# Patient Record
Sex: FEMALE | Race: WHITE | Employment: UNEMPLOYED | URBAN - METROPOLITAN AREA
[De-identification: names, ages, dates, MRNs, and addresses within clinical notes are randomized per-mention and may not be internally consistent; named-entity substitution may affect disease eponyms.]

---

## 2017-01-10 ENCOUNTER — ROUTINE PRENATAL (OUTPATIENT)
Dept: OBGYN CLINIC | Facility: CLINIC | Age: 32
End: 2017-01-10

## 2017-01-10 VITALS
DIASTOLIC BLOOD PRESSURE: 81 MMHG | HEART RATE: 114 BPM | SYSTOLIC BLOOD PRESSURE: 129 MMHG | BODY MASS INDEX: 28 KG/M2 | WEIGHT: 182 LBS

## 2017-01-10 DIAGNOSIS — Z34.82 ENCOUNTER FOR SUPERVISION OF OTHER NORMAL PREGNANCY IN SECOND TRIMESTER: Primary | ICD-10-CM

## 2017-01-10 PROBLEM — O26.899 RH NEGATIVE, MATERNAL: Status: ACTIVE | Noted: 2017-01-10

## 2017-01-10 PROBLEM — Z67.91 RH NEGATIVE, MATERNAL: Status: ACTIVE | Noted: 2017-01-10

## 2017-01-10 LAB
APPEARANCE: CLEAR
MULTISTIX LOT#: NORMAL NUMERIC
URINE-COLOR: YELLOW

## 2017-01-14 ENCOUNTER — HOSPITAL ENCOUNTER (OUTPATIENT)
Dept: ULTRASOUND IMAGING | Age: 32
Discharge: HOME | End: 2017-01-14
Attending: OBSTETRICS & GYNECOLOGY
Payer: COMMERCIAL

## 2017-01-14 DIAGNOSIS — Z34.92 ENCOUNTER FOR SUPERVISION OF NORMAL PREGNANCY IN SECOND TRIMESTER, UNSPECIFIED GRAVIDITY: ICD-10-CM

## 2017-01-14 PROCEDURE — 76805 OB US >/= 14 WKS SNGL FETUS: CPT

## 2017-02-01 ENCOUNTER — OFFICE VISIT (OUTPATIENT)
Dept: DERMATOLOGY CLINIC | Facility: CLINIC | Age: 32
End: 2017-02-01

## 2017-02-01 DIAGNOSIS — D23.70 BENIGN NEOPLASM OF SKIN OF LOWER LIMB, INCLUDING HIP, UNSPECIFIED LATERALITY: ICD-10-CM

## 2017-02-01 DIAGNOSIS — D23.4 BENIGN NEOPLASM OF SCALP AND SKIN OF NECK: ICD-10-CM

## 2017-02-01 DIAGNOSIS — D23.9 BENIGN NEOPLASM OF SKIN, UNSPECIFIED LOCATION: ICD-10-CM

## 2017-02-01 DIAGNOSIS — D23.5 BENIGN NEOPLASM OF SKIN OF TRUNK, EXCEPT SCROTUM: ICD-10-CM

## 2017-02-01 DIAGNOSIS — D23.30 BENIGN NEOPLASM OF SKIN OF FACE: ICD-10-CM

## 2017-02-01 DIAGNOSIS — D22.9 MULTIPLE NEVI: Primary | ICD-10-CM

## 2017-02-01 PROCEDURE — 99212 OFFICE O/P EST SF 10 MIN: CPT | Performed by: DERMATOLOGY

## 2017-02-01 PROCEDURE — 99213 OFFICE O/P EST LOW 20 MIN: CPT | Performed by: DERMATOLOGY

## 2017-02-09 ENCOUNTER — ROUTINE PRENATAL (OUTPATIENT)
Dept: OBGYN CLINIC | Facility: CLINIC | Age: 32
End: 2017-02-09

## 2017-02-09 VITALS
BODY MASS INDEX: 29 KG/M2 | SYSTOLIC BLOOD PRESSURE: 134 MMHG | HEART RATE: 123 BPM | WEIGHT: 189.38 LBS | DIASTOLIC BLOOD PRESSURE: 88 MMHG

## 2017-02-09 DIAGNOSIS — Z34.92 ENCOUNTER FOR SUPERVISION OF NORMAL PREGNANCY IN SECOND TRIMESTER, UNSPECIFIED GRAVIDITY: Primary | ICD-10-CM

## 2017-02-09 LAB
MULTISTIX LOT#: NORMAL NUMERIC
PH, URINE: 7 (ref 4.5–8)
SPECIFIC GRAVITY: 1.01 (ref 1–1.03)
UROBILINOGEN,SEMI-QN: 0.2 MG/DL (ref 0–1.9)

## 2017-02-09 NOTE — PROGRESS NOTES
Reviewed Level 1 US. Pt with SOB when running after kids otherwise no SOB or CP.  Given lab orders and instructions for Rhogam   RTC 4 wks

## 2017-02-13 NOTE — PROGRESS NOTES
Juan Manuel Goodman is a 27year old female. HPI:     CC:  Patient presents with:  Full Skin Exam: LOV 7/27/2015 with Dr. Maribel Burgess. pt presenting for full body skin exam. Pt denies personal and family hx of skin cancer.         Allergies:  Review of patient's al anesthetic No     Social History Narrative     Family History   Problem Relation Age of Onset   • Heart Disease Paternal Grandmother      hypotension   • Eye Problems Paternal Grandmother      macular degeneration   • Breast Cancer Other      cause of deat diagnosis)  Benign neoplasm of scalp and skin of neck  Benign neoplasm of skin of face  Benign neoplasm of skin of trunk, except scrotum  Benign neoplasm of skin of lower limb, including hip, unspecified laterality  Benign neoplasm of skin, unspecified loc

## 2017-03-04 ENCOUNTER — APPOINTMENT (OUTPATIENT)
Dept: LAB | Facility: HOSPITAL | Age: 32
End: 2017-03-04
Attending: OBSTETRICS & GYNECOLOGY
Payer: COMMERCIAL

## 2017-03-04 DIAGNOSIS — Z34.92 ENCOUNTER FOR SUPERVISION OF NORMAL PREGNANCY IN SECOND TRIMESTER, UNSPECIFIED GRAVIDITY: ICD-10-CM

## 2017-03-04 LAB
ANTIBODY SCREEN: NEGATIVE
ERYTHROCYTE [DISTWIDTH] IN BLOOD BY AUTOMATED COUNT: 13.6 % (ref 11–15)
GLUCOSE 1H P 50 G GLC PO SERPL-MCNC: 147 MG/DL
HCT VFR BLD AUTO: 37 % (ref 35–48)
HGB BLD-MCNC: 12.7 G/DL (ref 12–16)
MCH RBC QN AUTO: 32.1 PG (ref 27–32)
MCHC RBC AUTO-ENTMCNC: 34.2 G/DL (ref 32–37)
MCV RBC AUTO: 93.6 FL (ref 80–100)
PLATELET # BLD AUTO: 220 K/UL (ref 140–400)
PMV BLD AUTO: 8.4 FL (ref 7.4–10.3)
RBC # BLD AUTO: 3.95 M/UL (ref 3.7–5.4)
RH BLOOD TYPE: NEGATIVE
WBC # BLD AUTO: 14.7 K/UL (ref 4–11)

## 2017-03-04 PROCEDURE — 86850 RBC ANTIBODY SCREEN: CPT

## 2017-03-04 PROCEDURE — 82950 GLUCOSE TEST: CPT

## 2017-03-04 PROCEDURE — 86900 BLOOD TYPING SEROLOGIC ABO: CPT

## 2017-03-04 PROCEDURE — 85027 COMPLETE CBC AUTOMATED: CPT

## 2017-03-04 PROCEDURE — 36415 COLL VENOUS BLD VENIPUNCTURE: CPT

## 2017-03-04 PROCEDURE — 86901 BLOOD TYPING SEROLOGIC RH(D): CPT

## 2017-03-06 ENCOUNTER — TELEPHONE (OUTPATIENT)
Dept: OBGYN CLINIC | Facility: CLINIC | Age: 32
End: 2017-03-06

## 2017-03-06 DIAGNOSIS — Z34.82 ENCOUNTER FOR SUPERVISION OF OTHER NORMAL PREGNANCY IN SECOND TRIMESTER: Primary | ICD-10-CM

## 2017-03-06 NOTE — TELEPHONE ENCOUNTER
Pt informed she did not pass the 1 hr glucose test and will need to do the 3hr gtt. Pt instructed to call to schedule an appt for it. Pt also informed she will need to fast for this test.  Order placed.

## 2017-03-06 NOTE — TELEPHONE ENCOUNTER
Called pt and rescheduled her 3/9 [facility] Pt needs rhogam and cannot get it at [facility]. Pt verbalized understanding.

## 2017-03-06 NOTE — TELEPHONE ENCOUNTER
----- Message from Cinthya Haro MD sent at 3/5/2017  6:34 PM CST -----  Please let patient know that she did not pass one hour glucose test and will need to take 3 hour test.

## 2017-03-10 ENCOUNTER — ROUTINE PRENATAL (OUTPATIENT)
Dept: OBGYN CLINIC | Facility: CLINIC | Age: 32
End: 2017-03-10

## 2017-03-10 VITALS
BODY MASS INDEX: 30 KG/M2 | SYSTOLIC BLOOD PRESSURE: 128 MMHG | HEART RATE: 102 BPM | DIASTOLIC BLOOD PRESSURE: 85 MMHG | WEIGHT: 196.63 LBS

## 2017-03-10 DIAGNOSIS — Z67.91 BLOOD TYPE, RH NEGATIVE: ICD-10-CM

## 2017-03-10 DIAGNOSIS — Z34.93 ENCOUNTER FOR SUPERVISION OF NORMAL PREGNANCY IN THIRD TRIMESTER, UNSPECIFIED GRAVIDITY: Primary | ICD-10-CM

## 2017-03-10 LAB
MULTISTIX LOT#: NORMAL NUMERIC
PH, URINE: 6 (ref 4.5–8)
SPECIFIC GRAVITY: 1.01 (ref 1–1.03)
UROBILINOGEN,SEMI-QN: 0.2 MG/DL (ref 0–1.9)

## 2017-03-10 PROCEDURE — 96372 THER/PROPH/DIAG INJ SC/IM: CPT | Performed by: OBSTETRICS & GYNECOLOGY

## 2017-03-10 NOTE — PROGRESS NOTES
Rhogam vaccine administered to pts left upper outer gluteus. Pt tolerated well. VIS information sheet given to pt and encouraged to call with any questions or concerns. Rhogam card given to pt and instructed to keep with her at all times.  Pt verbalized und

## 2017-03-10 NOTE — PATIENT INSTRUCTIONS
If You Are Rh Negative – Routine Administration    If you’re Rh negative, ask your health care provider about getting treated with RhoGam or Rhophylac. Even if you miscarry or don’t deliver the baby, you will still need treatment.  The health of any baby as directed      Location, date and time:  [facility] 3/10/2017 @ 11:20am

## 2017-03-11 ENCOUNTER — LAB ENCOUNTER (OUTPATIENT)
Dept: LAB | Facility: HOSPITAL | Age: 32
End: 2017-03-11
Attending: OBSTETRICS & GYNECOLOGY
Payer: COMMERCIAL

## 2017-03-11 DIAGNOSIS — Z34.82 ENCOUNTER FOR SUPERVISION OF OTHER NORMAL PREGNANCY IN SECOND TRIMESTER: ICD-10-CM

## 2017-03-11 LAB
GLUCOSE 1H P GLC SERPL-MCNC: 198 MG/DL
GLUCOSE 2H P GLC SERPL-MCNC: 180 MG/DL
GLUCOSE 3H P GLC SERPL-MCNC: 131 MG/DL
GLUCOSE P FAST SERPL-MCNC: 103 MG/DL (ref 70–99)

## 2017-03-11 PROCEDURE — 36415 COLL VENOUS BLD VENIPUNCTURE: CPT

## 2017-03-11 PROCEDURE — 82952 GTT-ADDED SAMPLES: CPT

## 2017-03-11 PROCEDURE — 82951 GLUCOSE TOLERANCE TEST (GTT): CPT

## 2017-03-13 ENCOUNTER — TELEPHONE (OUTPATIENT)
Dept: OBGYN CLINIC | Facility: CLINIC | Age: 32
End: 2017-03-13

## 2017-03-13 DIAGNOSIS — O24.419 GESTATIONAL DIABETES MELLITUS (GDM) IN SECOND TRIMESTER, GESTATIONAL DIABETES METHOD OF CONTROL UNSPECIFIED: Primary | ICD-10-CM

## 2017-03-13 NOTE — TELEPHONE ENCOUNTER
Informed pt that she did not pass 3 hr gtt and she needs to see diabetic education asap. Gave pt phone number to schedule with [employer]abetic Educators. Faxed order to the diabetic educators.

## 2017-03-14 ENCOUNTER — TELEPHONE (OUTPATIENT)
Dept: OBGYN CLINIC | Facility: CLINIC | Age: 32
End: 2017-03-14

## 2017-03-14 NOTE — TELEPHONE ENCOUNTER
----- Message from Vickie Prader, MD sent at 3/12/2017  4:54 PM CDT -----  Please let patient know she did not pass 3 hour GTT and arrange for diabetic education.

## 2017-03-17 ENCOUNTER — HOSPITAL ENCOUNTER (OUTPATIENT)
Dept: ENDOCRINOLOGY | Facility: HOSPITAL | Age: 32
Discharge: HOME | End: 2017-03-17
Attending: OBSTETRICS & GYNECOLOGY
Payer: COMMERCIAL

## 2017-03-17 VITALS — BODY MASS INDEX: 29.72 KG/M2 | WEIGHT: 196.13 LBS | HEIGHT: 68 IN

## 2017-03-17 DIAGNOSIS — O24.410 DIET CONTROLLED GESTATIONAL DIABETES MELLITUS (GDM) IN THIRD TRIMESTER: Primary | ICD-10-CM

## 2017-03-17 PROBLEM — O24.419 GESTATIONAL DIABETES MELLITUS IN THIRD TRIMESTER: Status: ACTIVE | Noted: 2017-03-17

## 2017-03-17 NOTE — PROGRESS NOTES
Júniorbatsheva Herman  : 1985 was seen for Gestational Diabetes Counseling: Individual/Group    Date: 3/17/2017   Start time: 0900  End time: 80    Wt:  196.1#    Obtained usual diet history: 3 meals and 3 snacks daily.  Pt cooks everyday; meals are kumar [employer] Provided:  Printed materials covering each topic provided. Recommendations:      1. Follow recommended GDM meal plan. 2. Begin testing fasting glucose and 2 hours after meals   3. Bring glucose log to each MD office visit.    4. Encouraged Adela Norman

## 2017-03-21 ENCOUNTER — TELEPHONE (OUTPATIENT)
Dept: OBGYN CLINIC | Facility: CLINIC | Age: 32
End: 2017-03-21

## 2017-03-21 ENCOUNTER — ROUTINE PRENATAL (OUTPATIENT)
Dept: OBGYN CLINIC | Facility: CLINIC | Age: 32
End: 2017-03-21

## 2017-03-21 VITALS
BODY MASS INDEX: 30 KG/M2 | HEART RATE: 105 BPM | DIASTOLIC BLOOD PRESSURE: 76 MMHG | WEIGHT: 195 LBS | SYSTOLIC BLOOD PRESSURE: 120 MMHG

## 2017-03-21 DIAGNOSIS — Z34.93 ENCOUNTER FOR SUPERVISION OF NORMAL PREGNANCY IN THIRD TRIMESTER, UNSPECIFIED GRAVIDITY: Primary | ICD-10-CM

## 2017-03-21 PROBLEM — O24.419 GESTATIONAL DIABETES: Status: ACTIVE | Noted: 2017-03-21

## 2017-03-21 NOTE — PROGRESS NOTES
No issues reported. Pt with A1gdm at this time. BS log reviewed and good control. Continue to send sugars q3-4d. Wants tdap next visit. Discussed  testing. Pt wants tdap next visit.

## 2017-03-21 NOTE — TELEPHONE ENCOUNTER
Breast pump order received via fax from [address]. Form completed and faxed back to [address]

## 2017-03-22 ENCOUNTER — TELEPHONE (OUTPATIENT)
Dept: OBGYN CLINIC | Facility: CLINIC | Age: 32
End: 2017-03-22

## 2017-03-22 NOTE — TELEPHONE ENCOUNTER
Received another prior authorization for breast pump referral and referral and prior authorization form was routed to [phone].

## 2017-03-22 NOTE — TELEPHONE ENCOUNTER
Received form from [address] that referral needed for breast pump. Form given to Referral Nurse.

## 2017-03-24 ENCOUNTER — PATIENT MESSAGE (OUTPATIENT)
Dept: OBGYN CLINIC | Facility: CLINIC | Age: 32
End: 2017-03-24

## 2017-03-24 NOTE — TELEPHONE ENCOUNTER
From: Marisa George  To: Carter Valles DO  Sent: 3/24/2017 9:12 AM CDT  Subject: Other    Blood Sugar Numbers    3/21: B: 95 L: 107 D: 98  3/22: K: T F: 87 B: 102 L: 100 D: 92  3/23: K: T F: 85 B: 127 L: 124.  D: 96  3/24: K: S F: 97

## 2017-03-28 ENCOUNTER — HOSPITAL ENCOUNTER (OUTPATIENT)
Dept: ENDOCRINOLOGY | Facility: HOSPITAL | Age: 32
Discharge: HOME | End: 2017-03-28
Attending: OBSTETRICS & GYNECOLOGY
Payer: COMMERCIAL

## 2017-03-28 VITALS — BODY MASS INDEX: 30.06 KG/M2 | WEIGHT: 198.38 LBS | HEIGHT: 68 IN

## 2017-03-28 DIAGNOSIS — O24.410 DIET CONTROLLED GESTATIONAL DIABETES MELLITUS (GDM) IN THIRD TRIMESTER: Primary | ICD-10-CM

## 2017-03-28 NOTE — PROGRESS NOTES
Cheri Whitehead  : 1985 was seen for GDM Follow-Up Counseling    Date: 3/28/2017   Start time: 910  End time: 1010    Assessment:  Ht 68\"  Wt 198 lb 6.4 oz  BMI 30.17 kg/m2  LMP 2016 (Approximate)  Weight: Wt Readings from Last 6 Encounters: Advised to add an 8 oz glass of milk at bedtime to control ketone production. Patient verbalized understanding and has no further questions at this time.     Clydene Free

## 2017-03-31 ENCOUNTER — PATIENT MESSAGE (OUTPATIENT)
Dept: OBGYN CLINIC | Facility: CLINIC | Age: 32
End: 2017-03-31

## 2017-03-31 NOTE — TELEPHONE ENCOUNTER
From: Juan Manuel Goodman  To: Anamaria Love DO  Sent: 3/31/2017 7:44 AM CDT  Subject: Other    Blood Sugars    3/24: B: missed (diabetes appt.) L: 101 D: 114  3/25: K: T F: 78 B: 91 L: 121 D: 95  3/30: K: N F: 91 B: 96 L: 101 D: 108  3/31: K: T F: 85

## 2017-04-04 ENCOUNTER — ROUTINE PRENATAL (OUTPATIENT)
Dept: OBGYN CLINIC | Facility: CLINIC | Age: 32
End: 2017-04-04

## 2017-04-04 ENCOUNTER — HOSPITAL ENCOUNTER (OUTPATIENT)
Facility: HOSPITAL | Age: 32
Setting detail: OBSERVATION
Discharge: HOME | End: 2017-04-04
Attending: OBSTETRICS & GYNECOLOGY | Admitting: OBSTETRICS & GYNECOLOGY
Payer: COMMERCIAL

## 2017-04-04 ENCOUNTER — TELEPHONE (OUTPATIENT)
Dept: OBGYN CLINIC | Facility: CLINIC | Age: 32
End: 2017-04-04

## 2017-04-04 VITALS
SYSTOLIC BLOOD PRESSURE: 119 MMHG | BODY MASS INDEX: 30 KG/M2 | DIASTOLIC BLOOD PRESSURE: 78 MMHG | HEART RATE: 103 BPM | WEIGHT: 198 LBS

## 2017-04-04 VITALS — SYSTOLIC BLOOD PRESSURE: 118 MMHG | DIASTOLIC BLOOD PRESSURE: 76 MMHG | HEART RATE: 92 BPM

## 2017-04-04 DIAGNOSIS — Z34.83 ENCOUNTER FOR SUPERVISION OF OTHER NORMAL PREGNANCY IN THIRD TRIMESTER: Primary | ICD-10-CM

## 2017-04-04 PROCEDURE — 59025 FETAL NON-STRESS TEST: CPT | Performed by: OBSTETRICS & GYNECOLOGY

## 2017-04-04 NOTE — TELEPHONE ENCOUNTER
Called C Triage and scheduled pt today for her NST. Informed pt where to go for the NST for today and that she should schedule her weekly NST while she is there. Informed pt that the labs given to her today are for 35-37 weeks.   Pt stated understanding

## 2017-04-04 NOTE — NST
Nonstress Test   Patient: Micah Cunningham    Gestation: 32w0d    NST:       Variability: Moderate           Accelerations: Yes           Decelerations: None            Baseline: 135 BPM           Uterine Irritability: No                                   Aco

## 2017-04-04 NOTE — PROGRESS NOTES
NST note:    Indication: Gestational Diabetes    FHT baseline: 135    Variability: moderate    Accels:  present    Decels: No    Tocos:  Single UC    Category: 1 tracing    Plan: Weekly NST's

## 2017-04-04 NOTE — PROGRESS NOTES
Blood sugar log reviewed and continue diet control. NSTs starting this week. Pt to schedule appt as she needs to leave to  her son today. RTC 2wks. Has 35-37 wks labs.

## 2017-04-14 ENCOUNTER — HOSPITAL ENCOUNTER (OUTPATIENT)
Facility: HOSPITAL | Age: 32
Discharge: HOME | End: 2017-04-14
Attending: OBSTETRICS & GYNECOLOGY | Admitting: OBSTETRICS & GYNECOLOGY
Payer: COMMERCIAL

## 2017-04-14 ENCOUNTER — APPOINTMENT (OUTPATIENT)
Dept: OBGYN CLINIC | Facility: HOSPITAL | Age: 32
End: 2017-04-14
Payer: COMMERCIAL

## 2017-04-14 VITALS — HEART RATE: 89 BPM | DIASTOLIC BLOOD PRESSURE: 77 MMHG | SYSTOLIC BLOOD PRESSURE: 125 MMHG

## 2017-04-14 PROCEDURE — 59025 FETAL NON-STRESS TEST: CPT

## 2017-04-14 NOTE — NST
Nonstress Test   Patient: Elyssa Rhodes    Gestation: 33w3d    NST:       Variability: Moderate           Accelerations: Yes           Decelerations: None            Baseline: 130 BPM           Uterine Irritability: No                                   Aco

## 2017-04-18 ENCOUNTER — ROUTINE PRENATAL (OUTPATIENT)
Dept: OBGYN CLINIC | Facility: CLINIC | Age: 32
End: 2017-04-18

## 2017-04-18 VITALS
HEART RATE: 111 BPM | SYSTOLIC BLOOD PRESSURE: 119 MMHG | BODY MASS INDEX: 30 KG/M2 | DIASTOLIC BLOOD PRESSURE: 81 MMHG | WEIGHT: 200 LBS

## 2017-04-18 DIAGNOSIS — Z34.83 ENCOUNTER FOR SUPERVISION OF OTHER NORMAL PREGNANCY IN THIRD TRIMESTER: Primary | ICD-10-CM

## 2017-04-18 PROBLEM — Z30.2 REQUEST FOR STERILIZATION: Status: ACTIVE | Noted: 2017-04-18

## 2017-04-18 PROCEDURE — 59025 FETAL NON-STRESS TEST: CPT | Performed by: OBSTETRICS & GYNECOLOGY

## 2017-04-18 PROCEDURE — 90471 IMMUNIZATION ADMIN: CPT | Performed by: OBSTETRICS & GYNECOLOGY

## 2017-04-18 PROCEDURE — 90715 TDAP VACCINE 7 YRS/> IM: CPT | Performed by: OBSTETRICS & GYNECOLOGY

## 2017-04-19 ENCOUNTER — HOSPITAL ENCOUNTER (OUTPATIENT)
Facility: HOSPITAL | Age: 32
Discharge: HOME | End: 2017-04-19
Attending: OBSTETRICS & GYNECOLOGY | Admitting: OBSTETRICS & GYNECOLOGY
Payer: COMMERCIAL

## 2017-04-19 ENCOUNTER — APPOINTMENT (OUTPATIENT)
Dept: OBGYN CLINIC | Facility: HOSPITAL | Age: 32
End: 2017-04-19
Payer: COMMERCIAL

## 2017-04-19 ENCOUNTER — APPOINTMENT (OUTPATIENT)
Dept: ULTRASOUND IMAGING | Facility: HOSPITAL | Age: 32
End: 2017-04-19
Attending: OBSTETRICS & GYNECOLOGY
Payer: COMMERCIAL

## 2017-04-19 VITALS — HEART RATE: 97 BPM | RESPIRATION RATE: 20 BRPM | DIASTOLIC BLOOD PRESSURE: 73 MMHG | SYSTOLIC BLOOD PRESSURE: 122 MMHG

## 2017-04-19 VITALS — RESPIRATION RATE: 18 BRPM | SYSTOLIC BLOOD PRESSURE: 127 MMHG | HEART RATE: 97 BPM | DIASTOLIC BLOOD PRESSURE: 84 MMHG

## 2017-04-19 PROCEDURE — 76819 FETAL BIOPHYS PROFIL W/O NST: CPT

## 2017-04-19 PROCEDURE — 59025 FETAL NON-STRESS TEST: CPT | Performed by: OBSTETRICS & GYNECOLOGY

## 2017-04-19 NOTE — NST
Nonstress Test   Patient: True Boudreaux    Gestation: 34w1d    NST: REPEAT NST FROM THIS MORNING,  YAN AND BPP DONE IN ULTRASOUND       Variability: Moderate           Accelerations: Yes           Decelerations: Variable            Baseline: 135 BPM

## 2017-04-19 NOTE — PROGRESS NOTES
PATIENT RETURNED FROM ULTRASOUND. HAD YAN AND BPP. RESULTS REPORTED TO DR. Mariano Dickens. 8/8 BPP AND YAN IS 18.72.  REACTIVE NST.

## 2017-04-19 NOTE — NST
Nonstress Test   Patient: Rachael Tripp    Gestation: 34w1d    NST: scheduled nst for diet controlled gestational diabetes       Variability: Moderate           Accelerations: Yes           Decelerations: None            Baseline: 145 BPM           Uterine

## 2017-04-25 ENCOUNTER — APPOINTMENT (OUTPATIENT)
Dept: OBGYN CLINIC | Facility: HOSPITAL | Age: 32
End: 2017-04-25
Payer: COMMERCIAL

## 2017-04-25 ENCOUNTER — HOSPITAL ENCOUNTER (OUTPATIENT)
Facility: HOSPITAL | Age: 32
Discharge: HOME | End: 2017-04-25
Attending: OBSTETRICS & GYNECOLOGY | Admitting: OBSTETRICS & GYNECOLOGY
Payer: COMMERCIAL

## 2017-04-25 VITALS — HEART RATE: 89 BPM | SYSTOLIC BLOOD PRESSURE: 121 MMHG | DIASTOLIC BLOOD PRESSURE: 82 MMHG

## 2017-04-25 PROCEDURE — 59025 FETAL NON-STRESS TEST: CPT | Performed by: OBSTETRICS & GYNECOLOGY

## 2017-04-25 NOTE — NST
Nonstress Test   Patient: Kaylee Shook    Gestation: 35w0d    NST: A1GDM       Variability: Moderate           Accelerations: Yes           Decelerations: None            Baseline: 135 BPM           Uterine Irritability: No           Contractions: Irregul

## 2017-05-02 ENCOUNTER — APPOINTMENT (OUTPATIENT)
Dept: LAB | Facility: HOSPITAL | Age: 32
End: 2017-05-02
Attending: OBSTETRICS & GYNECOLOGY
Payer: COMMERCIAL

## 2017-05-02 ENCOUNTER — HOSPITAL ENCOUNTER (OUTPATIENT)
Facility: HOSPITAL | Age: 32
Discharge: HOME | End: 2017-05-02
Attending: OBSTETRICS & GYNECOLOGY | Admitting: OBSTETRICS & GYNECOLOGY
Payer: COMMERCIAL

## 2017-05-02 ENCOUNTER — HOSPITAL ENCOUNTER (OUTPATIENT)
Dept: OBGYN CLINIC | Facility: HOSPITAL | Age: 32
Discharge: HOME | End: 2017-05-02
Payer: COMMERCIAL

## 2017-05-02 ENCOUNTER — ROUTINE PRENATAL (OUTPATIENT)
Dept: OBGYN CLINIC | Facility: CLINIC | Age: 32
End: 2017-05-02

## 2017-05-02 VITALS
BODY MASS INDEX: 31 KG/M2 | DIASTOLIC BLOOD PRESSURE: 78 MMHG | HEART RATE: 91 BPM | SYSTOLIC BLOOD PRESSURE: 120 MMHG | WEIGHT: 203.81 LBS

## 2017-05-02 VITALS — SYSTOLIC BLOOD PRESSURE: 126 MMHG | DIASTOLIC BLOOD PRESSURE: 82 MMHG | HEART RATE: 83 BPM

## 2017-05-02 DIAGNOSIS — Z34.83 ENCOUNTER FOR SUPERVISION OF OTHER NORMAL PREGNANCY IN THIRD TRIMESTER: Primary | ICD-10-CM

## 2017-05-02 DIAGNOSIS — Z34.83 ENCOUNTER FOR SUPERVISION OF OTHER NORMAL PREGNANCY IN THIRD TRIMESTER: ICD-10-CM

## 2017-05-02 PROCEDURE — 59025 FETAL NON-STRESS TEST: CPT

## 2017-05-02 PROCEDURE — 36415 COLL VENOUS BLD VENIPUNCTURE: CPT

## 2017-05-02 PROCEDURE — 85027 COMPLETE CBC AUTOMATED: CPT

## 2017-05-02 PROCEDURE — 86780 TREPONEMA PALLIDUM: CPT

## 2017-05-02 NOTE — NST
Nonstress Test   Patient: Ascencion Zaragoza    Gestation: 36w0d    NST: A1GDM       Variability: Moderate           Accelerations: Yes           Decelerations: None            Baseline: 145 BPM           Uterine Irritability: No           Contractions: Not pre

## 2017-05-09 ENCOUNTER — APPOINTMENT (OUTPATIENT)
Dept: OBGYN CLINIC | Facility: HOSPITAL | Age: 32
End: 2017-05-09
Payer: COMMERCIAL

## 2017-05-09 ENCOUNTER — HOSPITAL ENCOUNTER (OUTPATIENT)
Facility: HOSPITAL | Age: 32
Discharge: HOME | End: 2017-05-09
Attending: OBSTETRICS & GYNECOLOGY | Admitting: OBSTETRICS & GYNECOLOGY
Payer: COMMERCIAL

## 2017-05-09 ENCOUNTER — ROUTINE PRENATAL (OUTPATIENT)
Dept: OBGYN CLINIC | Facility: CLINIC | Age: 32
End: 2017-05-09

## 2017-05-09 VITALS
SYSTOLIC BLOOD PRESSURE: 123 MMHG | DIASTOLIC BLOOD PRESSURE: 82 MMHG | BODY MASS INDEX: 31 KG/M2 | WEIGHT: 206 LBS | HEART RATE: 94 BPM

## 2017-05-09 DIAGNOSIS — Z34.93 ENCOUNTER FOR SUPERVISION OF NORMAL PREGNANCY IN THIRD TRIMESTER, UNSPECIFIED GRAVIDITY: Primary | ICD-10-CM

## 2017-05-09 PROCEDURE — 59025 FETAL NON-STRESS TEST: CPT | Performed by: OBSTETRICS & GYNECOLOGY

## 2017-05-09 NOTE — NST
Nonstress Test   Patient: Jay Osbornedmont    Gestation: 37w0d    NST: A1GDM       Variability: Moderate           Accelerations: Yes           Decelerations: None            Baseline: 140 BPM           Uterine Irritability: No           Contractions: Irregul

## 2017-05-15 DIAGNOSIS — O24.410 DIET CONTROLLED GESTATIONAL DIABETES MELLITUS (GDM) IN THIRD TRIMESTER: ICD-10-CM

## 2017-05-15 NOTE — TELEPHONE ENCOUNTER
From: Carlota Castro  To:  Ekaterina Donald MD  Sent: 5/15/2017 2:41 PM CDT  Subject: Medication Renewal Request    Original authorizing provider: MD Carlota Salazar would like a refill of the following medications:  Glucose Blood (LIBERTAD C

## 2017-05-16 ENCOUNTER — HOSPITAL ENCOUNTER (OUTPATIENT)
Facility: HOSPITAL | Age: 32
Discharge: HOME | End: 2017-05-16
Attending: OBSTETRICS & GYNECOLOGY | Admitting: OBSTETRICS & GYNECOLOGY
Payer: COMMERCIAL

## 2017-05-16 ENCOUNTER — ROUTINE PRENATAL (OUTPATIENT)
Dept: OBGYN CLINIC | Facility: CLINIC | Age: 32
End: 2017-05-16

## 2017-05-16 ENCOUNTER — APPOINTMENT (OUTPATIENT)
Dept: OBGYN CLINIC | Facility: HOSPITAL | Age: 32
End: 2017-05-16
Payer: COMMERCIAL

## 2017-05-16 VITALS
HEART RATE: 96 BPM | WEIGHT: 211 LBS | DIASTOLIC BLOOD PRESSURE: 82 MMHG | BODY MASS INDEX: 32 KG/M2 | SYSTOLIC BLOOD PRESSURE: 125 MMHG

## 2017-05-16 VITALS — HEART RATE: 85 BPM | DIASTOLIC BLOOD PRESSURE: 84 MMHG | SYSTOLIC BLOOD PRESSURE: 123 MMHG

## 2017-05-16 DIAGNOSIS — Z34.93 ENCOUNTER FOR SUPERVISION OF NORMAL PREGNANCY IN THIRD TRIMESTER, UNSPECIFIED GRAVIDITY: Primary | ICD-10-CM

## 2017-05-16 PROCEDURE — 59025 FETAL NON-STRESS TEST: CPT | Performed by: OBSTETRICS & GYNECOLOGY

## 2017-05-16 NOTE — PROGRESS NOTES
NST today. Reviewed kick counts and labor precautions. Patient with irregular contractions.    RTC 1 wk

## 2017-05-16 NOTE — NST
Nonstress Test   Patient: True Boudreaux    Gestation: 38w0d    NST: A1GDM       Variability: Moderate           Accelerations: Yes           Decelerations: None            Baseline: 135 BPM           Uterine Irritability: No           Contractions: Irregul

## 2017-05-23 ENCOUNTER — HOSPITAL ENCOUNTER (OUTPATIENT)
Facility: HOSPITAL | Age: 32
Discharge: HOME | End: 2017-05-23
Attending: OBSTETRICS & GYNECOLOGY | Admitting: OBSTETRICS & GYNECOLOGY
Payer: COMMERCIAL

## 2017-05-23 ENCOUNTER — OFFICE VISIT (OUTPATIENT)
Dept: OBGYN CLINIC | Facility: CLINIC | Age: 32
End: 2017-05-23

## 2017-05-23 ENCOUNTER — HOSPITAL ENCOUNTER (OUTPATIENT)
Dept: OBGYN CLINIC | Facility: HOSPITAL | Age: 32
Discharge: HOME | End: 2017-05-23
Payer: COMMERCIAL

## 2017-05-23 VITALS
SYSTOLIC BLOOD PRESSURE: 129 MMHG | WEIGHT: 208 LBS | HEART RATE: 80 BPM | DIASTOLIC BLOOD PRESSURE: 85 MMHG | BODY MASS INDEX: 32 KG/M2

## 2017-05-23 VITALS — DIASTOLIC BLOOD PRESSURE: 77 MMHG | SYSTOLIC BLOOD PRESSURE: 127 MMHG | TEMPERATURE: 99 F | HEART RATE: 85 BPM

## 2017-05-23 DIAGNOSIS — Z34.93 ENCOUNTER FOR SUPERVISION OF NORMAL PREGNANCY IN THIRD TRIMESTER, UNSPECIFIED GRAVIDITY: Primary | ICD-10-CM

## 2017-05-23 PROCEDURE — 59025 FETAL NON-STRESS TEST: CPT | Performed by: OBSTETRICS & GYNECOLOGY

## 2017-05-23 NOTE — NST
Nonstress Test   Patient: Marisa George    Gestation: 39w0d    NST:       Variability: Moderate           Accelerations: Yes           Decelerations: None            Baseline: 135 BPM           Uterine Irritability: No           Contractions: Irregular

## 2017-05-26 ENCOUNTER — PATIENT MESSAGE (OUTPATIENT)
Dept: OBGYN CLINIC | Facility: CLINIC | Age: 32
End: 2017-05-26

## 2017-05-26 NOTE — TELEPHONE ENCOUNTER
From: Henry Abreu  To: Jaren Fabian MD  Sent: 5/26/2017 7:50 AM CDT  Subject: Other    Blood Sugars    5/23: D: 109  5/24: K: N F: 78. B: 98. L: 117. D: 107  5/25: K: T. F: 80. B: 102. L: 114.  D: 102  5/26: K: T. F: 86

## 2017-05-30 ENCOUNTER — APPOINTMENT (OUTPATIENT)
Dept: OBGYN CLINIC | Facility: HOSPITAL | Age: 32
End: 2017-05-30
Payer: COMMERCIAL

## 2017-05-30 ENCOUNTER — OFFICE VISIT (OUTPATIENT)
Dept: OBGYN CLINIC | Facility: CLINIC | Age: 32
End: 2017-05-30

## 2017-05-30 ENCOUNTER — HOSPITAL ENCOUNTER (INPATIENT)
Facility: HOSPITAL | Age: 32
LOS: 3 days | Discharge: HOME | End: 2017-06-02
Attending: OBSTETRICS & GYNECOLOGY | Admitting: OBSTETRICS & GYNECOLOGY
Payer: COMMERCIAL

## 2017-05-30 VITALS
WEIGHT: 212 LBS | HEART RATE: 74 BPM | SYSTOLIC BLOOD PRESSURE: 121 MMHG | DIASTOLIC BLOOD PRESSURE: 80 MMHG | BODY MASS INDEX: 32 KG/M2

## 2017-05-30 DIAGNOSIS — Z34.93 ENCOUNTER FOR SUPERVISION OF NORMAL PREGNANCY IN THIRD TRIMESTER, UNSPECIFIED GRAVIDITY: Primary | ICD-10-CM

## 2017-05-30 PROBLEM — O36.8390 FETAL HEART RATE DECELERATIONS AFFECTING MANAGEMENT OF MOTHER: Status: ACTIVE | Noted: 2017-05-30

## 2017-05-30 PROBLEM — Z3A.40 40 WEEKS GESTATION OF PREGNANCY: Status: ACTIVE | Noted: 2017-05-30

## 2017-05-30 PROCEDURE — 82570 ASSAY OF URINE CREATININE: CPT | Performed by: OBSTETRICS & GYNECOLOGY

## 2017-05-30 PROCEDURE — 80053 COMPREHEN METABOLIC PANEL: CPT | Performed by: OBSTETRICS & GYNECOLOGY

## 2017-05-30 PROCEDURE — 85027 COMPLETE CBC AUTOMATED: CPT | Performed by: OBSTETRICS & GYNECOLOGY

## 2017-05-30 PROCEDURE — 86850 RBC ANTIBODY SCREEN: CPT | Performed by: OBSTETRICS & GYNECOLOGY

## 2017-05-30 PROCEDURE — 84550 ASSAY OF BLOOD/URIC ACID: CPT | Performed by: OBSTETRICS & GYNECOLOGY

## 2017-05-30 PROCEDURE — 84156 ASSAY OF PROTEIN URINE: CPT | Performed by: OBSTETRICS & GYNECOLOGY

## 2017-05-30 PROCEDURE — 86900 BLOOD TYPING SEROLOGIC ABO: CPT | Performed by: OBSTETRICS & GYNECOLOGY

## 2017-05-30 PROCEDURE — 82962 GLUCOSE BLOOD TEST: CPT

## 2017-05-30 PROCEDURE — 86901 BLOOD TYPING SEROLOGIC RH(D): CPT | Performed by: OBSTETRICS & GYNECOLOGY

## 2017-05-30 RX ORDER — DEXTROSE, SODIUM CHLORIDE, SODIUM LACTATE, POTASSIUM CHLORIDE, AND CALCIUM CHLORIDE 5; .6; .31; .03; .02 G/100ML; G/100ML; G/100ML; G/100ML; G/100ML
125 INJECTION, SOLUTION INTRAVENOUS CONTINUOUS
Status: DISCONTINUED | OUTPATIENT
Start: 2017-05-30 | End: 2017-05-31 | Stop reason: HOSPADM

## 2017-05-30 RX ORDER — SODIUM CHLORIDE 0.9 % (FLUSH) 0.9 %
10 SYRINGE (ML) INJECTION AS NEEDED
Status: DISCONTINUED | OUTPATIENT
Start: 2017-05-30 | End: 2017-05-31 | Stop reason: HOSPADM

## 2017-05-30 RX ORDER — AMMONIA INHALANTS 0.04 G/.3ML
0.3 INHALANT RESPIRATORY (INHALATION) AS NEEDED
Status: DISCONTINUED | OUTPATIENT
Start: 2017-05-30 | End: 2017-05-31 | Stop reason: HOSPADM

## 2017-05-30 RX ORDER — LIDOCAINE HYDROCHLORIDE 10 MG/ML
30 INJECTION, SOLUTION EPIDURAL; INFILTRATION; INTRACAUDAL; PERINEURAL ONCE
Status: DISCONTINUED | OUTPATIENT
Start: 2017-05-30 | End: 2017-05-31 | Stop reason: HOSPADM

## 2017-05-30 RX ORDER — ONDANSETRON 2 MG/ML
4 INJECTION INTRAMUSCULAR; INTRAVENOUS EVERY 6 HOURS PRN
Status: DISCONTINUED | OUTPATIENT
Start: 2017-05-30 | End: 2017-05-31 | Stop reason: HOSPADM

## 2017-05-30 RX ORDER — TERBUTALINE SULFATE 1 MG/ML
0.25 INJECTION, SOLUTION SUBCUTANEOUS AS NEEDED
Status: DISCONTINUED | OUTPATIENT
Start: 2017-05-30 | End: 2017-05-31 | Stop reason: HOSPADM

## 2017-05-30 NOTE — H&P
[address] Patient Status:  Inpatient    1985 MRN [id number]   Location [address] C-D Attending Manual Stack, [address] [facility] Day # 0 PCP Ricardo Wren MD     Date of Admission:  5/3 • Hypertension      Just in pregnancy. Has white coat syndrome and tends to run on the higher side but has never needed medication.      Past Social History:     Past Surgical History    D & C      Comment June 2016     Family History:   Family History SPECIFIC GRAVITY 1.1010 (A) 1.005 - 1.030   OCCULT BLOOD neg Negative   PH, URINE 6.0 4.5 - 8.0   PROTEIN (URINE DIPSTICK) neg Negative/Trace mg/dL   UROBILINOGEN,SEMI-QN 0.2 0.0 - 1.9 mg/dL   NITRITE, URINE neg Negative   LEUKOCYTES neg Negative   APPEA

## 2017-05-31 ENCOUNTER — ANESTHESIA EVENT (OUTPATIENT)
Dept: OBGYN UNIT | Facility: HOSPITAL | Age: 32
End: 2017-05-31
Payer: COMMERCIAL

## 2017-05-31 ENCOUNTER — ANESTHESIA (OUTPATIENT)
Dept: OBGYN UNIT | Facility: HOSPITAL | Age: 32
End: 2017-05-31
Payer: COMMERCIAL

## 2017-05-31 PROBLEM — Z34.90 PREGNANCY: Status: ACTIVE | Noted: 2017-05-31

## 2017-05-31 PROCEDURE — 0KQM0ZZ REPAIR PERINEUM MUSCLE, OPEN APPROACH: ICD-10-PCS | Performed by: OBSTETRICS & GYNECOLOGY

## 2017-05-31 PROCEDURE — 51701 INSERT BLADDER CATHETER: CPT

## 2017-05-31 PROCEDURE — 82962 GLUCOSE BLOOD TEST: CPT

## 2017-05-31 RX ORDER — HYDROCODONE BITARTRATE AND ACETAMINOPHEN 5; 325 MG/1; MG/1
1 TABLET ORAL EVERY 6 HOURS PRN
Status: DISCONTINUED | OUTPATIENT
Start: 2017-05-31 | End: 2017-06-02

## 2017-05-31 RX ORDER — AMMONIA INHALANTS 0.04 G/.3ML
0.3 INHALANT RESPIRATORY (INHALATION) AS NEEDED
Status: DISCONTINUED | OUTPATIENT
Start: 2017-05-31 | End: 2017-06-02

## 2017-05-31 RX ORDER — ACETAMINOPHEN 325 MG/1
650 TABLET ORAL EVERY 6 HOURS PRN
Status: DISCONTINUED | OUTPATIENT
Start: 2017-05-31 | End: 2017-06-02

## 2017-05-31 RX ORDER — SIMETHICONE 80 MG
80 TABLET,CHEWABLE ORAL 3 TIMES DAILY PRN
Status: DISCONTINUED | OUTPATIENT
Start: 2017-05-31 | End: 2017-06-02

## 2017-05-31 RX ORDER — DOCUSATE SODIUM 100 MG/1
100 CAPSULE, LIQUID FILLED ORAL 2 TIMES DAILY
Status: DISCONTINUED | OUTPATIENT
Start: 2017-05-31 | End: 2017-06-02

## 2017-05-31 RX ORDER — IBUPROFEN 600 MG/1
600 TABLET ORAL EVERY 6 HOURS PRN
Status: DISCONTINUED | OUTPATIENT
Start: 2017-05-31 | End: 2017-06-02

## 2017-05-31 RX ORDER — NALBUPHINE HCL 10 MG/ML
2.5 AMPUL (ML) INJECTION
Status: DISCONTINUED | OUTPATIENT
Start: 2017-05-31 | End: 2017-06-02

## 2017-05-31 RX ORDER — LIDOCAINE HYDROCHLORIDE AND EPINEPHRINE 20; 5 MG/ML; UG/ML
INJECTION, SOLUTION EPIDURAL; INFILTRATION; INTRACAUDAL; PERINEURAL
Status: DISCONTINUED
Start: 2017-05-31 | End: 2017-05-31 | Stop reason: WASHOUT

## 2017-05-31 RX ORDER — EPHEDRINE SULFATE/0.9% NACL/PF 25 MG/5 ML
5 SYRINGE (ML) INTRAVENOUS AS NEEDED
Status: DISCONTINUED | OUTPATIENT
Start: 2017-05-31 | End: 2017-06-02

## 2017-05-31 RX ORDER — DEXTROSE, SODIUM CHLORIDE, SODIUM LACTATE, POTASSIUM CHLORIDE, AND CALCIUM CHLORIDE 5; .6; .31; .03; .02 G/100ML; G/100ML; G/100ML; G/100ML; G/100ML
INJECTION, SOLUTION INTRAVENOUS
Status: COMPLETED
Start: 2017-05-31 | End: 2017-05-31

## 2017-05-31 RX ORDER — LIDOCAINE HYDROCHLORIDE 10 MG/ML
INJECTION, SOLUTION EPIDURAL; INFILTRATION; INTRACAUDAL; PERINEURAL AS NEEDED
Status: DISCONTINUED | OUTPATIENT
Start: 2017-05-31 | End: 2017-05-31 | Stop reason: SURG

## 2017-05-31 RX ORDER — ONDANSETRON 2 MG/ML
4 INJECTION INTRAMUSCULAR; INTRAVENOUS EVERY 6 HOURS PRN
Status: DISCONTINUED | OUTPATIENT
Start: 2017-05-31 | End: 2017-06-02

## 2017-05-31 RX ORDER — BUPIVACAINE HYDROCHLORIDE 2.5 MG/ML
INJECTION, SOLUTION EPIDURAL; INFILTRATION; INTRACAUDAL
Status: DISPENSED
Start: 2017-05-31 | End: 2017-06-01

## 2017-05-31 RX ORDER — SODIUM CHLORIDE, SODIUM LACTATE, POTASSIUM CHLORIDE, CALCIUM CHLORIDE 600; 310; 30; 20 MG/100ML; MG/100ML; MG/100ML; MG/100ML
INJECTION, SOLUTION INTRAVENOUS
Status: DISPENSED
Start: 2017-05-31 | End: 2017-06-01

## 2017-05-31 RX ORDER — SODIUM CHLORIDE 0.9 % (FLUSH) 0.9 %
10 SYRINGE (ML) INJECTION AS NEEDED
Status: DISCONTINUED | OUTPATIENT
Start: 2017-05-31 | End: 2017-06-02

## 2017-05-31 RX ORDER — EPHEDRINE SULFATE/0.9% NACL/PF 25 MG/5 ML
SYRINGE (ML) INTRAVENOUS
Status: DISCONTINUED
Start: 2017-05-31 | End: 2017-05-31 | Stop reason: WASHOUT

## 2017-05-31 RX ORDER — BUPIVACAINE HYDROCHLORIDE 2.5 MG/ML
INJECTION, SOLUTION EPIDURAL; INFILTRATION; INTRACAUDAL AS NEEDED
Status: DISCONTINUED | OUTPATIENT
Start: 2017-05-31 | End: 2017-05-31 | Stop reason: SURG

## 2017-05-31 RX ORDER — BISACODYL 10 MG
10 SUPPOSITORY, RECTAL RECTAL ONCE AS NEEDED
Status: DISCONTINUED | OUTPATIENT
Start: 2017-05-31 | End: 2017-06-02

## 2017-05-31 RX ORDER — DEXTROSE, SODIUM CHLORIDE, SODIUM LACTATE, POTASSIUM CHLORIDE, AND CALCIUM CHLORIDE 5; .6; .31; .03; .02 G/100ML; G/100ML; G/100ML; G/100ML; G/100ML
INJECTION, SOLUTION INTRAVENOUS
Status: DISPENSED
Start: 2017-05-31 | End: 2017-06-01

## 2017-05-31 RX ORDER — LIDOCAINE HYDROCHLORIDE AND EPINEPHRINE 15; 5 MG/ML; UG/ML
INJECTION, SOLUTION EPIDURAL AS NEEDED
Status: DISCONTINUED | OUTPATIENT
Start: 2017-05-31 | End: 2017-05-31 | Stop reason: SURG

## 2017-05-31 RX ORDER — PHENYLEPHRINE HCL IN 0.9% NACL 0.5 MG/5ML
SYRINGE (ML) INTRAVENOUS
Status: DISCONTINUED
Start: 2017-05-31 | End: 2017-05-31 | Stop reason: WASHOUT

## 2017-05-31 RX ORDER — DIAPER,BRIEF,INFANT-TODD,DISP
1 EACH MISCELLANEOUS EVERY 6 HOURS PRN
Status: DISCONTINUED | OUTPATIENT
Start: 2017-05-31 | End: 2017-06-02

## 2017-05-31 RX ORDER — PRENATAL VIT,CAL 76/IRON/FOLIC 29 MG-1 MG
1 TABLET ORAL DAILY
Status: DISCONTINUED | OUTPATIENT
Start: 2017-05-31 | End: 2017-06-02

## 2017-05-31 RX ADMIN — LIDOCAINE HYDROCHLORIDE 5 ML: 10 INJECTION, SOLUTION EPIDURAL; INFILTRATION; INTRACAUDAL; PERINEURAL at 15:55:00

## 2017-05-31 RX ADMIN — BUPIVACAINE HYDROCHLORIDE 2.5 MG: 2.5 INJECTION, SOLUTION EPIDURAL; INFILTRATION; INTRACAUDAL at 15:55:00

## 2017-05-31 RX ADMIN — LIDOCAINE HYDROCHLORIDE AND EPINEPHRINE 5 ML: 15; 5 INJECTION, SOLUTION EPIDURAL at 15:55:00

## 2017-05-31 NOTE — ANESTHESIA PREPROCEDURE EVALUATION
Anesthesia PreOp Note    HPI:     Ivonne Baron is a 27year old female who presents for preoperative consultation requested by: * No surgeons listed *    Date of Surgery: 5/31/2017    * No procedures listed *  Indication: * No pre-op diagnosis entered * Ordered in Epic:  oxyTOCIN (PITOCIN) 30 units/ 500 ml premix infusion 0.5-20 suze-units/min Intravenous Continuous Earna Laundry, DO Last Rate: 14 mL/hr at 05/31/17 1413 14 suze-units/min at 05/31/17 1413   Ampicillin Sodium (OMNIPEN) 1 g in sodium Results  Component Value Date   WBC 11.1* 05/30/2017   RBC 4.22 05/30/2017   HGB 13.1 05/30/2017   HCT 38.7 05/30/2017   MCV 91.8 05/30/2017   MCH 31.2 05/30/2017   MCHC 34.0 05/30/2017   RDW 13.8 05/30/2017    05/30/2017   MPV 8.8 05/30/2017 anesthetic plan, benefits, risks, major complications, and any alternative forms of anesthetic management. All of the patient's questions were answered to the best of my ability. The patient desires the anesthetic management as planned.   Enrique Rome,

## 2017-05-31 NOTE — ANESTHESIA PROCEDURE NOTES
Labor Analgesia  Performed by: Lionel Mejia by: Shad Nichols    Patient Location:  OB  Start Time:  5/31/2017 3:55 PM  End Time:  5/31/2017 4:12 PM  Site Identification: surface landmarks    Reason for Block: labor epidural    Anest

## 2017-05-31 NOTE — PROGRESS NOTES
[facility]    Labor Progress Note    Karna Spatz Patient Status:  Inpatient    1985 MRN [id number]   Location [address] C-D Attending Geoffrey Power, 160[address] [facility] Day # 1 PCP MD Orestes Bellamy with patient who verbalizes understanding and agreement.     Denise Spain  5/31/2017

## 2017-05-31 NOTE — PROGRESS NOTES
5/31/2017, 9:06 AM    Subjective:  Patient is starting to feeling contractions     Objective:   05/30/17  1730 05/30/17  2200 05/31/17  0210 05/31/17  0832   BP: 133/88 127/85 138/93    Pulse: 100 96 106    Temp:    98.3 °F (36.8 °C)   TempSrc:    Oral   R

## 2017-06-01 PROCEDURE — 85025 COMPLETE CBC W/AUTO DIFF WBC: CPT | Performed by: OBSTETRICS & GYNECOLOGY

## 2017-06-01 NOTE — LACTATION NOTE
LACTATION NOTE - MOTHER           Problems identified  Problems identified: Knowledge deficit    Maternal history  Maternal history: Gestational diabetes; Induction of labor    Breastfeeding goal  Breastfeeding goal: To maintain breast milk feeding per magdalene

## 2017-06-01 NOTE — ANESTHESIA POSTPROCEDURE EVALUATION
Patient: Elyssa Rhodes    Procedure Summary     Date Anesthesia Start Anesthesia Stop Room / Location    05/31/17 [id number] [id number]        Procedure Diagnosis Scheduled Providers Responsible Provider    LABOR ANALGESIA No diagnosis on file.   Alexis Antoine MD

## 2017-06-01 NOTE — L&D DELIVERY NOTE
[facility]    Vaginal Delivery Note    [facility] Patient Status:  Inpatient    1985 MRN [id number]   Location [address] C-D Attending William Bautista, [address] [facility] Day # 1 PCP Ronald Hull MD     Delivery     Infant

## 2017-06-01 NOTE — DISCHARGE SUMMARY
[facility]    Discharge Summary    Neptali Rae Patient Status:  Inpatient    1985 MRN [id number]   Location [address] C-D Attending Laura Wilks, [address] [facility] Day # 1       Admit date:  2017    Discharge date:

## 2017-06-02 VITALS
RESPIRATION RATE: 16 BRPM | HEART RATE: 78 BPM | OXYGEN SATURATION: 100 % | DIASTOLIC BLOOD PRESSURE: 83 MMHG | SYSTOLIC BLOOD PRESSURE: 123 MMHG | TEMPERATURE: 98 F

## 2017-06-02 RX ORDER — HYDROCODONE BITARTRATE AND ACETAMINOPHEN 5; 325 MG/1; MG/1
1 TABLET ORAL EVERY 6 HOURS PRN
Qty: 30 TABLET | Refills: 0 | Status: SHIPPED | OUTPATIENT
Start: 2017-06-02 | End: 2017-07-13

## 2017-06-02 RX ORDER — IBUPROFEN 600 MG/1
600 TABLET ORAL EVERY 6 HOURS PRN
Qty: 30 TABLET | Refills: 0 | Status: SHIPPED | OUTPATIENT
Start: 2017-06-02 | End: 2017-10-13

## 2017-06-02 NOTE — LACTATION NOTE
This note was copied from the chart of [address] [place] [address].   LACTATION NOTE - INFANT    Evaluation Type  Evaluation Type: Inpatient    Problems & Assessment  Infant Assessment: Skin color: pink or appropriate for ethnicity;Good skin turgor  Muscle tone: Appropriate

## 2017-06-02 NOTE — PROGRESS NOTES
[facility]    OB/Gyne Post  Progress Note      Gabriela Dailey Patient Status:  Inpatient    1985 MRN [id number]   Location [facility] 3SE Attending Ruy Cruz, [address] [facility] Day # 2 PCP MD Gab Joiner third trimester     Gestational diabetes     Request for sterilization     40 weeks gestation of pregnancy     Fetal heart rate decelerations affecting management of mother     Pregnancy  .     ambulate, continue routine postpartum care      The patient had

## 2017-07-13 ENCOUNTER — POSTPARTUM (OUTPATIENT)
Dept: OBGYN CLINIC | Facility: CLINIC | Age: 32
End: 2017-07-13

## 2017-07-13 ENCOUNTER — PATIENT MESSAGE (OUTPATIENT)
Dept: OBGYN CLINIC | Facility: CLINIC | Age: 32
End: 2017-07-13

## 2017-07-13 VITALS
BODY MASS INDEX: 27 KG/M2 | DIASTOLIC BLOOD PRESSURE: 79 MMHG | WEIGHT: 178 LBS | SYSTOLIC BLOOD PRESSURE: 117 MMHG | HEART RATE: 73 BPM

## 2017-07-13 PROBLEM — O24.419 GESTATIONAL DIABETES: Status: RESOLVED | Noted: 2017-03-21 | Resolved: 2017-07-13

## 2017-07-13 PROBLEM — Z30.2 REQUEST FOR STERILIZATION: Status: RESOLVED | Noted: 2017-04-18 | Resolved: 2017-07-13

## 2017-07-13 PROBLEM — Z67.91 RH NEGATIVE, MATERNAL: Status: RESOLVED | Noted: 2017-01-10 | Resolved: 2017-07-13

## 2017-07-13 PROBLEM — O36.8390 FETAL HEART RATE DECELERATIONS AFFECTING MANAGEMENT OF MOTHER: Status: RESOLVED | Noted: 2017-05-30 | Resolved: 2017-07-13

## 2017-07-13 PROBLEM — O26.899 RH NEGATIVE, MATERNAL: Status: RESOLVED | Noted: 2017-01-10 | Resolved: 2017-07-13

## 2017-07-13 NOTE — TELEPHONE ENCOUNTER
From: Ivonne Baron  To: Arnaldo Obregon MD  Sent: 7/13/2017 2:00 PM CDT  Subject: Non-Urgent Medical Question    I scheduled my 2 hour glucose tolerance test for post-partum. they said that i need to fast 10-12 hours before.  i am breastfeeding solely and

## 2017-07-17 NOTE — PROGRESS NOTES
Cheri Whitehead is a 27year old female [id number] here for 6 week post-partum visit. Patient delivered a  male infant on 17 via . Patient desires nothing for contraception. Patient is breast feeding.    Patient denies symptoms of depression, Bowen menstrual period 08/05/2016, currently breastfeeding.   General:  Well nourished, well developed woman in no acute distress  Abdomen:  soft, nontender, no masses  External Genitalia: normal appearance, hair distribution, and no lesions  Vagina:  Normal appe

## 2017-08-12 ENCOUNTER — LAB ENCOUNTER (OUTPATIENT)
Dept: LAB | Age: 32
End: 2017-08-12
Attending: OBSTETRICS & GYNECOLOGY
Payer: COMMERCIAL

## 2017-08-12 LAB
GLUCOSE 1H P GLC SERPL-MCNC: 135 MG/DL
GLUCOSE 2H P GLC SERPL-MCNC: 105 MG/DL
GLUCOSE P FAST SERPL-MCNC: 94 MG/DL (ref 70–99)

## 2017-08-12 PROCEDURE — 82951 GLUCOSE TOLERANCE TEST (GTT): CPT

## 2017-08-12 PROCEDURE — 36415 COLL VENOUS BLD VENIPUNCTURE: CPT

## 2017-08-15 ENCOUNTER — PATIENT MESSAGE (OUTPATIENT)
Dept: OBGYN CLINIC | Facility: CLINIC | Age: 32
End: 2017-08-15

## 2017-08-15 NOTE — TELEPHONE ENCOUNTER
From: Lashaun Peralta  To: Whitley Escoto MD  Sent: 8/15/2017 9:06 AM CDT  Subject: Test Results Question    I was wondering if my 2 hour glucose results had come in? thanks!

## 2017-10-13 ENCOUNTER — OFFICE VISIT (OUTPATIENT)
Dept: INTERNAL MEDICINE CLINIC | Facility: CLINIC | Age: 32
End: 2017-10-13

## 2017-10-13 VITALS
SYSTOLIC BLOOD PRESSURE: 114 MMHG | HEART RATE: 72 BPM | HEIGHT: 68 IN | DIASTOLIC BLOOD PRESSURE: 80 MMHG | TEMPERATURE: 98 F | WEIGHT: 172 LBS | BODY MASS INDEX: 26.07 KG/M2 | RESPIRATION RATE: 18 BRPM

## 2017-10-13 DIAGNOSIS — Z00.00 PE (PHYSICAL EXAM), ROUTINE: Primary | ICD-10-CM

## 2017-10-13 DIAGNOSIS — Z23 NEED FOR VACCINATION: ICD-10-CM

## 2017-10-13 PROCEDURE — 90686 IIV4 VACC NO PRSV 0.5 ML IM: CPT | Performed by: INTERNAL MEDICINE

## 2017-10-13 PROCEDURE — 99395 PREV VISIT EST AGE 18-39: CPT | Performed by: INTERNAL MEDICINE

## 2017-10-13 PROCEDURE — 99212 OFFICE O/P EST SF 10 MIN: CPT | Performed by: INTERNAL MEDICINE

## 2017-10-13 PROCEDURE — 90471 IMMUNIZATION ADMIN: CPT | Performed by: INTERNAL MEDICINE

## 2017-10-13 RX ORDER — GARLIC EXTRACT 500 MG
1 CAPSULE ORAL DAILY
COMMUNITY

## 2017-10-13 NOTE — PROGRESS NOTES
HPI:    Patient ID: Maverick Bustillos is a 34year old female.   Patient patient presents today for physical exam, patient  Is  Breast  Feeding   Had  2 month old  Son  ,states doing well otherwise, denies chest pain, shortness of breath, dyspnea on exertion or Eyes: Right eye exhibits no discharge. Left eye exhibits no discharge. No scleral icterus. Neck: Neck supple. No JVD present. No thyromegaly present. Cardiovascular: Normal rate and normal heart sounds. No murmur heard.   Pulmonary/Chest: Effort norm Need for vaccination -  Influenza     Lactating mother      Umbilical  Hernia   Pt  educaiton  Avoid  Lifting   Observe  For now    If  Pain   growing  bigger  or  Tenderness f/o  Sooner       Orders Placed This Encounter      CBC W Differential W Platelet

## 2017-11-29 ENCOUNTER — OFFICE VISIT (OUTPATIENT)
Dept: OBGYN CLINIC | Facility: CLINIC | Age: 32
End: 2017-11-29

## 2017-11-29 VITALS
SYSTOLIC BLOOD PRESSURE: 124 MMHG | WEIGHT: 170 LBS | HEART RATE: 81 BPM | DIASTOLIC BLOOD PRESSURE: 83 MMHG | BODY MASS INDEX: 26 KG/M2

## 2017-11-29 DIAGNOSIS — Z01.419 ENCOUNTER FOR GYNECOLOGICAL EXAMINATION: Primary | ICD-10-CM

## 2017-11-29 PROCEDURE — 99395 PREV VISIT EST AGE 18-39: CPT | Performed by: OBSTETRICS & GYNECOLOGY

## 2017-11-30 NOTE — PROGRESS NOTES
Jennifer Wilder is a 34year old female [id number] No LMP recorded. Patient is not currently having periods (Reason: Breastfeeding). here for annual exam.       Last seen in pregnancy. Had  with [facility] on 2017. Breastfeeding but starting to wean.   Susan incontinence, abnormal vaginal discharge, vaginal itching  Musculoskeletal:  denies back pain. Skin/Breast:  Denies any breast pain, lumps, or discharge. Neurological:  denies headaches, extremity weakness or numbness.   Psychiatric: denies depression or

## 2017-12-09 ENCOUNTER — PATIENT MESSAGE (OUTPATIENT)
Dept: INTERNAL MEDICINE CLINIC | Facility: CLINIC | Age: 32
End: 2017-12-09

## 2017-12-09 DIAGNOSIS — K42.9 UMBILICAL HERNIA WITHOUT OBSTRUCTION AND WITHOUT GANGRENE: Primary | ICD-10-CM

## 2017-12-11 NOTE — TELEPHONE ENCOUNTER
From: Robert Cifuentes  To: Ashely Zaman MD  Sent: 12/9/2017 1:09 PM CST  Subject: Referral Request    I currently have an umbilical hernia from pregnancy. I would like to see a surgeon to have it evaluated.  I would like a referral through [facility] for

## 2018-01-05 ENCOUNTER — OFFICE VISIT (OUTPATIENT)
Dept: SURGERY | Facility: CLINIC | Age: 33
End: 2018-01-05

## 2018-01-05 VITALS — HEIGHT: 68 IN | BODY MASS INDEX: 25.31 KG/M2 | WEIGHT: 167 LBS

## 2018-01-05 DIAGNOSIS — K42.9 UMBILICAL HERNIA WITHOUT OBSTRUCTION AND WITHOUT GANGRENE: Primary | ICD-10-CM

## 2018-01-05 DIAGNOSIS — M62.08 DIASTASIS RECTI: ICD-10-CM

## 2018-01-05 PROCEDURE — 99204 OFFICE O/P NEW MOD 45 MIN: CPT | Performed by: SURGERY

## 2018-01-05 PROCEDURE — 99212 OFFICE O/P EST SF 10 MIN: CPT | Performed by: SURGERY

## 2018-01-05 NOTE — PROGRESS NOTES
Visit Note    Active Problems   Umbilical hernia without obstruction and without gangrene  (primary encounter diagnosis)  Diastasis recti  Chief Complaint: Patient presents with:  Hernia: Patient referred by PCP Dr. Lawson Cui for umbilical hernia.  She lund socially    Drug use: No    Sexual activity: Yes    Partners: Male    Birth control/ protection: Condom     Other Topics Concern    Caffeine Concern Yes    Comment: decaff coffee    Pt has a pacemaker No    Pt has a defibrillator No    Reaction to local an Musculoskeletal: Normal range of motion. She exhibits no edema, tenderness or deformity. Neurological: She is alert and oriented to person, place, and time. She has normal reflexes. She displays normal reflexes. No cranial nerve deficit.  She exhibi

## 2018-01-06 NOTE — PATIENT INSTRUCTIONS
Assessment   Umbilical hernia without obstruction and without gangrene  (primary encounter diagnosis)  Diastasis recti    Plan                No Need for surgery unless noticed to enlarge significantly or there are complications, such as incarceration or s

## (undated) NOTE — LETTER
12/19/17        [facility]  [id number] [place] [address]      Dear Sundar Flynn,    Our records indicate that you have outstanding lab work and or testing that was ordered for you and has not yet been completed:          CBC W Differential W Platelet [E

## (undated) NOTE — Clinical Note
AGREEMENT FOR TREATMENT WITH Rh IMMUNE GLOBULIN      To:   ________________________________________________ Adelina ARIAS (provider) [facility]     Date: _______________________________ Time: ___________________ AM

## (undated) NOTE — Clinical Note
VACCINE ADMINISTRATION RECORD  PARENT / GUARDIAN APPROVAL  Date: 2017  Vaccine administered to: Robert Cifuentes     : 1985    MRN: [id number]    A copy of the appropriate Centers for Disease Control and Prevention Vaccine Information statement h

## (undated) NOTE — MR AVS SNAPSHOT
[place]  [address]  [phone]               Thank you for choosing us for your health care visit with Jeff Carranza MD.  We are glad to serve you and happy to provide you with this summary of 1 each by Other route 4 (four) times daily. Use as directed. Commonly known as:  [employer] CONTOUR NEXT TEST           PRENATAL 27-0.8 MG Tabs   Take 1 tablet by mouth daily.                    Results of Recent Testing     URINALYSIS NONAUTO W/O SCOPE      C Eat plenty of protein, keep the fat content low Sugars:  sodas and sports drinks, candies and desserts   Eat plenty of low-fat dairy products High fat meats and dairy   Choose whole grain products Foods high in sodium   Water is best for hydration Fast maria r

## (undated) NOTE — MR AVS SNAPSHOT
[facility] [place] [address]  [id number] [phone]               Thank you for choosing us for your health care visit with Curtis Sim MD.  We are glad to serve you and happy to provide you with this summary of This list is accurate as of: 2/1/17  9:41 PM.  Always use your most recent med list.                PRENATAL 27-0.8 MG Tabs   Take 1 tablet by mouth daily.                    MyChart     Visit MyChart  You can access your MyChart to more actively manage you

## (undated) NOTE — Clinical Note
If You Are Rh Negative – Routine Administration    If you’re Rh negative, ask your health care provider about getting treated with RhoGam or Rhophylac. Even if you miscarry or don’t deliver the baby, you will still need treatment.  The health of any baby yo o RhoGam or Rhophylac injection in your provider’s office as directed      Location, date and time:  [facility] 3/10/2017 @ 11:20am

## (undated) NOTE — IP AVS SNAPSHOT
[address]  [address] [facility] Karen Almendarez ~ [phone]                Discharge Summary   5/30/2017    Rachael Tripp           Admission Information        Provider Department    5/30/2017 DO Kacy Anaya 3se LIBERTAD MICROLET LANCETS Misc        1 each by Finger stick route 4 (four) times daily. Use as directed.     Karolee People     [    ]    [    ]    [    ]    [    ]       Glucose Blood Strp   Commonly known as:  LIBERTAD CONTOUR NEXT TEST        1 each by Stacey Weinberg Blood Type Rh Factor Rubella GBBS    (05/30/17)  AB (05/30/17)  Negative (10/11/16)  Immune --    (03/04/17)  AB (03/04/17)  Negative      (10/11/16)  AB (10/11/16)  Negative        Recent Hematology Lab Results  (Last 3 results in the past 90 days)    WB Cluster pumping Resolved   Breast massage / compression / manual expression techniques Resolved   Galagtogogues - contraindicators, side effects, and recommended to discuss their use with pediatric MD and OB prior to the start if applicable Resolved   Enco Proper labeling / transport of expressed milk to NICU / SCN if applicable Resolved   Ways to stimulate Milk Ejection Reflex (YOSELIN) Resolved   Hands-on pumping Resolved   Pumping and breastfeeding frequency guidelines Resolved   Breastfeeding adequacy signs and would appreciate you taking a moment to complete and return this survey.                Additional Information       We are concerned for your overall well being:    - If you are a smoker or have smoked in the last 12 months, we encourage you to explore

## (undated) NOTE — MR AVS SNAPSHOT
[place]  [address]  [phone]               Thank you for choosing us for your health care visit with Trenda Dandy, MD.  We are glad to serve you and happy to provide you with this summa 1 each by Other route 4 (four) times daily. Use as directed. Commonly known as:  [employer] CONTOUR NEXT TEST           PRENATAL 27-0.8 MG Tabs   Take 1 tablet by mouth daily.                    Results of Recent Testing     URINALYSIS NONAUTO W/O SCOPE      C